# Patient Record
Sex: FEMALE | Race: WHITE
[De-identification: names, ages, dates, MRNs, and addresses within clinical notes are randomized per-mention and may not be internally consistent; named-entity substitution may affect disease eponyms.]

---

## 2017-07-12 ENCOUNTER — HOSPITAL ENCOUNTER (OUTPATIENT)
Dept: HOSPITAL 76 - DI.S | Age: 77
Discharge: HOME | End: 2017-07-12
Attending: INTERNAL MEDICINE
Payer: MEDICARE

## 2017-07-12 DIAGNOSIS — Z12.31: Primary | ICD-10-CM

## 2017-07-12 PROCEDURE — 77067 SCR MAMMO BI INCL CAD: CPT

## 2017-08-08 ENCOUNTER — HOSPITAL ENCOUNTER (OUTPATIENT)
Dept: HOSPITAL 76 - LAB | Age: 77
Discharge: HOME | End: 2017-08-08
Attending: ORTHOPAEDIC SURGERY
Payer: MEDICARE

## 2017-08-08 DIAGNOSIS — Z79.899: ICD-10-CM

## 2017-08-08 DIAGNOSIS — M47.896: Primary | ICD-10-CM

## 2017-08-08 DIAGNOSIS — M51.36: ICD-10-CM

## 2017-08-08 DIAGNOSIS — M47.897: ICD-10-CM

## 2017-08-08 DIAGNOSIS — M51.37: ICD-10-CM

## 2017-08-08 LAB
BUN SERPL-MCNC: 19 MG/DL (ref 6–20)
CREAT SERPLBLD-SCNC: 0.7 MG/DL (ref 0.4–1)
GFRSERPLBLD MDRD-ARVRAT: 81 ML/MIN/{1.73_M2} (ref 89–?)

## 2017-08-08 PROCEDURE — 84520 ASSAY OF UREA NITROGEN: CPT

## 2017-08-08 PROCEDURE — 82565 ASSAY OF CREATININE: CPT

## 2017-08-08 PROCEDURE — 72158 MRI LUMBAR SPINE W/O & W/DYE: CPT

## 2017-08-08 PROCEDURE — 36415 COLL VENOUS BLD VENIPUNCTURE: CPT

## 2017-08-09 NOTE — MRI REPORT
EXAM:

MRI LUMBAR SPINE WITHOUT AND WITH CONTRAST

 

EXAM DATE: 8/8/2017 02:35 PM.

 

CLINICAL HISTORY: Low back pain. History of surgery November 2016, effusion and laminectomy.

 

COMPARISONS: MRI of the lumbar spine 10/10/2016.

 

TECHNIQUE: Multiplanar, multisequence T1-weighted and fluid-sensitive sequences of the lumbar spine f
rom T12 to S1 before and after administration of intravenous contrast. IV contrast: 7.5 mL Gadavist. 
Other: None.

 

FINDINGS: 

Spinal Cord: The conus terminates at L1. The conus medullaris and cauda equina are unremarkable.

 

Alignment: Mild dextrorotatory scoliosis is seen centered at L3-L4. Mild, 3 mm, spondylolisthesis of 
L4 is seen in relation to L5 and L3.

 

Bone Marrow: Five non-rib-bearing lumbar vertebral bodies are assumed. No gross fractures or bone les
ions. No bone marrow edema or abnormal enhancement. Diskogenic endplate signal abnormality and enhanc
ement is seen at L3-L4.

 

Disk Levels/Facets:

T12-L1: Unremarkable on sagittal series.

 

L1-L2: Mild thickening of the ligamentum flavum is seen bilaterally. Mild loss of disk space height i
s seen. Endplate irregularity is seen greater to the right. Mild circumferential disk bulge is noted.
 No stenosis. No significant change.

 

L2-L3: Interval postoperative change from inferior bilateral laminectomy and partial facetectomy. Mod
erate loss of disk space height is seen. Mild circumferential disk bulge is seen. With posterior deco
mpression, interval resolution of previously noted canal stenosis.

 

L3-L4: Interval postoperative change from bilateral laminectomy and partial facetectomy. Moderate los
s of disk space height is seen. Diskogenic endplate irregularity with Modic type I change including e
mara and enhancement is present. Mild retrolisthesis. Mild circumferential disk bulge. Dorsal and lef
t dorsolateral disk protrusion extending inferiorly posterior to the L4 superior endplate. Associated
 posterior displacement of the descending left L4 nerve root is seen within the upper lateral recess.
 With posterior decompression, interval resolution of previously noted canal stenosis.

 

L4-L5: Interval postoperative change. Bilateral laminectomy and partial facetectomy is seen. Posterol
ateral fusion with transpedicular screws is seen. Mild spondylolisthesis. Minimal dorsal and mild fabiola
tral circumferential disk bulge. With posterior decompression, interval resolution of previously note
d canal stenosis.

 

L5-S1: Mild degenerative facet change is seen bilaterally. Moderate to marked loss of disk space heig
ht is seen. Minimal dorsal with mild lateral and ventral circumferential disk bulge. No stenosis.

 

Spinal Canal: No enhancing masses within the spinal canal. No epidural abscess.

 

Musculature: Mild atrophy of posterior paraspinous musculature is seen in the mid and lower lumbar as
 well as upper sacral spine. Posterior surgical bed edema and enhancement is seen without fluid colle
ction.

 

Other: The partially visualized retroperitoneum is unremarkable.

 

IMPRESSION: 

1. Interval postoperative change as outlined above. Posterior decompression is seen with resolution o
f previously noted multilevel canal stenosis.

 

2. L1-L2: Mild to moderate degenerative disk and facet change. No stenosis.

 

3. L2-L3: Postoperative posterior decompression. Moderate degenerative disk change. Resolution of can
al stenosis.

 

4. L3-L4: Postoperative posterior decompression. Moderate degenerative disk change. Diskogenic endpla
te signal abnormality. Resolution of previously noted canal stenosis. Persistent posterior displaceme
nt of the proximal descending left L4 nerve root, with resolution of lateral recess stenosis.

 

5. L4-L5: Postoperative change. Posterior decompression and posterolateral fusion. Resolution of bhaskar
l stenosis.

 

6. L5-S1: Spondylosis with degenerative disk and facet change. No stenosis.

 

Comment: The following findings are so common in adults without low back pain that while we report th
eir presence, they must be interpreted with caution and in the context of the clinical situation. (Re
jair Agustin et al, Spine 2001)

 

Prevalence of findings in patients without low back pain:

Disk degeneration (any evidence): 92%

Disk desiccation/T2 signal loss: 83%

Disk height loss: 56%

Disk bulge: 64%

Disk protrusion: 32%

Annular tear/high intensity zone: 38%

 

[facility]

Referring Provider Line: [phone]

 

SITE ID: [id number]

## 2018-07-10 ENCOUNTER — HOSPITAL ENCOUNTER (OUTPATIENT)
Dept: HOSPITAL 76 - LAB.F | Age: 78
Discharge: HOME | End: 2018-07-10
Attending: INTERNAL MEDICINE
Payer: MEDICARE

## 2018-07-10 DIAGNOSIS — R20.0: ICD-10-CM

## 2018-07-10 DIAGNOSIS — R03.0: ICD-10-CM

## 2018-07-10 DIAGNOSIS — E03.9: Primary | ICD-10-CM

## 2018-07-10 DIAGNOSIS — E78.5: ICD-10-CM

## 2018-07-10 DIAGNOSIS — Z86.19: ICD-10-CM

## 2018-07-10 DIAGNOSIS — R73.9: ICD-10-CM

## 2018-07-10 DIAGNOSIS — Z79.899: ICD-10-CM

## 2018-07-10 DIAGNOSIS — M25.50: ICD-10-CM

## 2018-07-10 LAB
ALBUMIN DIAFP-MCNC: 4.2 G/DL (ref 3.2–5.5)
ALBUMIN/GLOB SERPL: 1.2 {RATIO} (ref 1–2.2)
ALP SERPL-CCNC: 53 IU/L (ref 42–121)
ALT SERPL W P-5'-P-CCNC: 18 IU/L (ref 10–60)
ANION GAP SERPL CALCULATED.4IONS-SCNC: 8 MMOL/L (ref 6–13)
AST SERPL W P-5'-P-CCNC: 24 IU/L (ref 10–42)
BASOPHILS NFR BLD AUTO: 0 10^3/UL (ref 0–0.1)
BASOPHILS NFR BLD AUTO: 0.6 %
BILIRUB BLD-MCNC: 1.2 MG/DL (ref 0.2–1)
BUN SERPL-MCNC: 16 MG/DL (ref 6–20)
CALCIUM UR-MCNC: 9.8 MG/DL (ref 8.5–10.3)
CHLORIDE SERPL-SCNC: 99 MMOL/L (ref 101–111)
CHOLEST SERPL-MCNC: 288 MG/DL
CO2 SERPL-SCNC: 26 MMOL/L (ref 21–32)
CREAT SERPLBLD-SCNC: 0.6 MG/DL (ref 0.4–1)
EOSINOPHIL # BLD AUTO: 0.1 10^3/UL (ref 0–0.7)
EOSINOPHIL NFR BLD AUTO: 2.4 %
ERYTHROCYTE [DISTWIDTH] IN BLOOD BY AUTOMATED COUNT: 12 % (ref 12–15)
EST. AVERAGE GLUCOSE BLD GHB EST-MCNC: 111 MG/DL (ref 70–100)
GFRSERPLBLD MDRD-ARVRAT: 96 ML/MIN/{1.73_M2} (ref 89–?)
GLOBULIN SER-MCNC: 3.6 G/DL (ref 2.1–4.2)
GLUCOSE SERPL-MCNC: 110 MG/DL (ref 70–100)
HB2 TOTAL: 15.3 G/DL
HBA1C BLD-MCNC: 0.56 G/DL
HDLC SERPL-MCNC: 78 MG/DL
HDLC SERPL: 3.7 {RATIO} (ref ?–4.4)
HEMOGLOBIN A1C %: 5.5 % (ref 4.6–6.2)
HGB UR QL STRIP: 13.5 G/DL (ref 12–16)
LDLC SERPL CALC-MCNC: 185 MG/DL
LDLC/HDLC SERPL: 2.4 {RATIO} (ref ?–4.4)
LYMPHOCYTES # SPEC AUTO: 2.1 10^3/UL (ref 1.5–3.5)
LYMPHOCYTES NFR BLD AUTO: 42.3 %
MCH RBC QN AUTO: 32.1 PG (ref 27–31)
MCHC RBC AUTO-ENTMCNC: 33.6 G/DL (ref 32–36)
MCV RBC AUTO: 95.5 FL (ref 81–99)
MONOCYTES # BLD AUTO: 0.4 10^3/UL (ref 0–1)
MONOCYTES NFR BLD AUTO: 8.7 %
NEUTROPHILS # BLD AUTO: 2.3 10^3/UL (ref 1.5–6.6)
NEUTROPHILS # SNV AUTO: 5 X10^3/UL (ref 4.8–10.8)
NEUTROPHILS NFR BLD AUTO: 46 %
PDW BLD AUTO: 7.8 FL (ref 7.9–10.8)
PLATELET # BLD: 274 10^3/UL (ref 130–450)
PROT SPEC-MCNC: 7.8 G/DL (ref 6.7–8.2)
RBC MAR: 4.22 10^6/UL (ref 4.2–5.4)
SODIUM SERPLBLD-SCNC: 133 MMOL/L (ref 135–145)
TSH SERPL-ACNC: 2.22 UIU/ML (ref 0.34–5.6)
VIT B12 SERPL-MCNC: 410 PG/ML (ref 180–914)
VLDLC SERPL-SCNC: 25 MG/DL

## 2018-07-10 PROCEDURE — 85025 COMPLETE CBC W/AUTO DIFF WBC: CPT

## 2018-07-10 PROCEDURE — 80061 LIPID PANEL: CPT

## 2018-07-10 PROCEDURE — 80053 COMPREHEN METABOLIC PANEL: CPT

## 2018-07-10 PROCEDURE — 36415 COLL VENOUS BLD VENIPUNCTURE: CPT

## 2018-07-10 PROCEDURE — 83721 ASSAY OF BLOOD LIPOPROTEIN: CPT

## 2018-07-10 PROCEDURE — 82607 VITAMIN B-12: CPT

## 2018-07-10 PROCEDURE — 83036 HEMOGLOBIN GLYCOSYLATED A1C: CPT

## 2018-07-10 PROCEDURE — 84443 ASSAY THYROID STIM HORMONE: CPT

## 2018-07-13 ENCOUNTER — HOSPITAL ENCOUNTER (OUTPATIENT)
Dept: HOSPITAL 76 - DI | Age: 78
Discharge: HOME | End: 2018-07-13
Attending: INTERNAL MEDICINE
Payer: MEDICARE

## 2018-07-13 DIAGNOSIS — Z12.31: Primary | ICD-10-CM

## 2018-07-13 PROCEDURE — 77067 SCR MAMMO BI INCL CAD: CPT

## 2018-07-16 NOTE — MAMMOGRAPHY REPORT
Procedure Date:  07/13/2018   

Accession Number:  [id number] / [id number]                    

Procedure:  CAROLINE - Screening Mammo Dig Bilat CPT Code:  

 

FULL RESULT:

 

 

EXAM: Screening Mammo Dig Bilat

 

DATE: 7/13/2018 12:19 PM

 

CLINICAL HISTORY: 78-year-old female with a 10 year history of hormone 

therapy stopped in 1980.

 

TECHNIQUE: Bilateral CC and MLO views were obtained.

 

COMPARISON: 7/12/2017, 7/7/2016, 7/1/2015, 7/2/2014.

 

FINDINGS:

The breasts demonstrate scattered fibroglandular densities bilaterally. 

There are bilateral typically benign vascular calcifications as well as 

typically benign skin calcifications. No suspicious masses, clustered 

microcalcifications, or regions of architectural distortion are 

identified.

 

IMPRESSION: Benign findings

 

RECOMMENDATION: Routine annual screening unless otherwise clinically 

indicated.

 

BIRADS CATEGORY 2: Benign findings

 

STANDARD QUALIFYING STATEMENTS:

1.  This examination was reviewed with the aid of Computer-Aided 

Detection (CAD).

2.  A negative or benign  imaging report should not delay biopsy if 

clinically suspicious findings are present.  Consider surgical 

consultation if warrented.  More than 5% of cancers are not identified by 

imaging.

3.  Dense breasts may obscure an underlying neoplasm.

## 2019-07-16 ENCOUNTER — HOSPITAL ENCOUNTER (OUTPATIENT)
Dept: HOSPITAL 76 - DI.S | Age: 79
Discharge: HOME | End: 2019-07-16
Attending: INTERNAL MEDICINE
Payer: MEDICARE

## 2019-07-16 DIAGNOSIS — Z12.31: Primary | ICD-10-CM

## 2019-07-16 PROCEDURE — 77067 SCR MAMMO BI INCL CAD: CPT

## 2019-07-17 NOTE — MAMMOGRAPHY REPORT
Reason:  SCREENING MAMMO

Procedure Date:  07/16/2019   

Accession Number:  [id number] / [id number]                    

Procedure:  MGS - Screening Mammo Dig Bilat CPT Code:  

 

FULL RESULT:

 

 

EXAM: Screening Mammo Dig Bilat

 

DATE: 7/16/2019 10:36 AM

 

CLINICAL HISTORY:  Screening encounter. No reported risk factors.

 

TECHNIQUE: (B) - Bilateral  CC and MLO views were obtained.

 

COMPARISON: 7/13/2018 through 7/1/2015.

 

PARENCHYMAL PATTERN: (A) - The breast(s) demonstrate(s) scattered 

fibroglandular densities.

 

FINDINGS:

There are typically benign vascular calcifications. There are no 

suspicious masses, calcifications, or areas of distortion.

 

IMPRESSION: Benign findings. BI-RADS category 2.

 

RECOMMENDATION: (ANNUAL)  - Recommend routine annual screening 

mammography.

 

BI-RADS CATEGORY: (2) - Benign Findings.

 

STANDARD QUALIFYING STATEMENTS:

1.  This examination was reviewed with the aid of Computer-Aided 

Detection (CAD).

2.  A negative or benign  imaging report should not preclude biopsy if 

clinically suspicious findings are present.

3.  Dense breasts may obscure an underlying neoplasm.

4. This examination was reviewed without the aid of 3D breast imaging 

(tomosynthesis).

## 2019-07-18 ENCOUNTER — HOSPITAL ENCOUNTER (OUTPATIENT)
Dept: HOSPITAL 76 - LAB.S | Age: 79
Discharge: HOME | End: 2019-07-18
Attending: INTERNAL MEDICINE
Payer: MEDICARE

## 2019-07-18 DIAGNOSIS — C43.9: ICD-10-CM

## 2019-07-18 DIAGNOSIS — R32: ICD-10-CM

## 2019-07-18 DIAGNOSIS — R20.0: ICD-10-CM

## 2019-07-18 DIAGNOSIS — Z13.6: ICD-10-CM

## 2019-07-18 DIAGNOSIS — E03.9: ICD-10-CM

## 2019-07-18 DIAGNOSIS — R03.0: ICD-10-CM

## 2019-07-18 DIAGNOSIS — J30.2: ICD-10-CM

## 2019-07-18 DIAGNOSIS — R73.9: ICD-10-CM

## 2019-07-18 DIAGNOSIS — E78.5: Primary | ICD-10-CM

## 2019-07-18 DIAGNOSIS — Z79.899: ICD-10-CM

## 2019-07-18 LAB
ALBUMIN DIAFP-MCNC: 4.5 G/DL (ref 3.2–5.5)
ALBUMIN/GLOB SERPL: 1.3 {RATIO} (ref 1–2.2)
ALP SERPL-CCNC: 55 IU/L (ref 42–121)
ALT SERPL W P-5'-P-CCNC: 18 IU/L (ref 10–60)
ANION GAP SERPL CALCULATED.4IONS-SCNC: 12 MMOL/L (ref 6–13)
AST SERPL W P-5'-P-CCNC: 23 IU/L (ref 10–42)
BASOPHILS NFR BLD AUTO: 0 10^3/UL (ref 0–0.1)
BASOPHILS NFR BLD AUTO: 0.7 %
BILIRUB BLD-MCNC: 1.3 MG/DL (ref 0.2–1)
BUN SERPL-MCNC: 18 MG/DL (ref 6–20)
CALCIUM UR-MCNC: 9.9 MG/DL (ref 8.5–10.3)
CHLORIDE SERPL-SCNC: 98 MMOL/L (ref 101–111)
CHOLEST SERPL-MCNC: 316 MG/DL
CLARITY UR REFRACT.AUTO: (no result)
CO2 SERPL-SCNC: 26 MMOL/L (ref 21–32)
CREAT SERPLBLD-SCNC: 0.6 MG/DL (ref 0.4–1)
EOSINOPHIL # BLD AUTO: 0.2 10^3/UL (ref 0–0.7)
EOSINOPHIL NFR BLD AUTO: 3.2 %
ERYTHROCYTE [DISTWIDTH] IN BLOOD BY AUTOMATED COUNT: 11.9 % (ref 12–15)
EST. AVERAGE GLUCOSE BLD GHB EST-MCNC: 123 MG/DL (ref 70–100)
GFRSERPLBLD MDRD-ARVRAT: 96 ML/MIN/{1.73_M2} (ref 89–?)
GLOBULIN SER-MCNC: 3.6 G/DL (ref 2.1–4.2)
GLUCOSE SERPL-MCNC: 110 MG/DL (ref 70–100)
GLUCOSE UR QL STRIP.AUTO: NEGATIVE MG/DL
HB2 TOTAL: 14.3 G/DL
HBA1C BLD-MCNC: 0.58 G/DL
HDLC SERPL-MCNC: 74 MG/DL
HDLC SERPL: 4.3 {RATIO} (ref ?–4.4)
HEMOGLOBIN A1C %: 5.9 % (ref 4.6–6.2)
HGB UR QL STRIP: 13.5 G/DL (ref 12–16)
KETONES UR QL STRIP.AUTO: NEGATIVE MG/DL
LDLC SERPL CALC-MCNC: 201 MG/DL
LDLC/HDLC SERPL: 2.7 {RATIO} (ref ?–4.4)
LYMPHOCYTES # SPEC AUTO: 2 10^3/UL (ref 1.5–3.5)
LYMPHOCYTES NFR BLD AUTO: 35.1 %
MCH RBC QN AUTO: 31.5 PG (ref 27–31)
MCHC RBC AUTO-ENTMCNC: 33.3 G/DL (ref 32–36)
MCV RBC AUTO: 94.6 FL (ref 81–99)
MONOCYTES # BLD AUTO: 0.5 10^3/UL (ref 0–1)
MONOCYTES NFR BLD AUTO: 8.5 %
NEUTROPHILS # BLD AUTO: 3 10^3/UL (ref 1.5–6.6)
NEUTROPHILS # SNV AUTO: 5.6 X10^3/UL (ref 4.8–10.8)
NEUTROPHILS NFR BLD AUTO: 52.3 %
NITRITE UR QL STRIP.AUTO: POSITIVE
PDW BLD AUTO: 9.9 FL (ref 7.9–10.8)
PH UR STRIP.AUTO: 6.5 PH (ref 5–7.5)
PLATELET # BLD: 270 10^3/UL (ref 130–450)
PROT SPEC-MCNC: 8.1 G/DL (ref 6.7–8.2)
PROT UR STRIP.AUTO-MCNC: NEGATIVE MG/DL
RBC # UR STRIP.AUTO: NEGATIVE /UL
RBC # URNS HPF: (no result) /HPF (ref 0–5)
RBC MAR: 4.29 10^6/UL (ref 4.2–5.4)
SODIUM SERPLBLD-SCNC: 136 MMOL/L (ref 135–145)
SP GR UR STRIP.AUTO: 1.01 (ref 1–1.03)
SQUAMOUS URNS QL MICRO: (no result)
TSH SERPL-ACNC: 4.14 UIU/ML (ref 0.34–5.6)
UROBILINOGEN UR QL STRIP.AUTO: (no result) E.U./DL
UROBILINOGEN UR STRIP.AUTO-MCNC: NEGATIVE MG/DL
VIT B12 SERPL-MCNC: 504 PG/ML (ref 180–914)
VLDLC SERPL-SCNC: 41 MG/DL

## 2019-07-18 PROCEDURE — 36415 COLL VENOUS BLD VENIPUNCTURE: CPT

## 2019-07-18 PROCEDURE — 84443 ASSAY THYROID STIM HORMONE: CPT

## 2019-07-18 PROCEDURE — 83036 HEMOGLOBIN GLYCOSYLATED A1C: CPT

## 2019-07-18 PROCEDURE — 80061 LIPID PANEL: CPT

## 2019-07-18 PROCEDURE — 87181 SC STD AGAR DILUTION PER AGT: CPT

## 2019-07-18 PROCEDURE — 87086 URINE CULTURE/COLONY COUNT: CPT

## 2019-07-18 PROCEDURE — 83721 ASSAY OF BLOOD LIPOPROTEIN: CPT

## 2019-07-18 PROCEDURE — 81001 URINALYSIS AUTO W/SCOPE: CPT

## 2019-07-18 PROCEDURE — 80053 COMPREHEN METABOLIC PANEL: CPT

## 2019-07-18 PROCEDURE — 81003 URINALYSIS AUTO W/O SCOPE: CPT

## 2019-07-18 PROCEDURE — 85025 COMPLETE CBC W/AUTO DIFF WBC: CPT

## 2019-07-18 PROCEDURE — 82607 VITAMIN B-12: CPT

## 2019-12-07 ENCOUNTER — HOSPITAL ENCOUNTER (EMERGENCY)
Dept: HOSPITAL 76 - ED | Age: 79
LOS: 1 days | Discharge: HOME | End: 2019-12-08
Payer: MEDICARE

## 2019-12-07 ENCOUNTER — HOSPITAL ENCOUNTER (OUTPATIENT)
Dept: HOSPITAL 76 - EMS | Age: 79
Discharge: ANOTHER HOSPITAL | End: 2019-12-07
Attending: SURGERY
Payer: MEDICARE

## 2019-12-07 DIAGNOSIS — R55: Primary | ICD-10-CM

## 2019-12-07 LAB
ALBUMIN DIAFP-MCNC: 4.3 G/DL (ref 3.2–5.5)
ALBUMIN/GLOB SERPL: 1.1 {RATIO} (ref 1–2.2)
ALP SERPL-CCNC: 59 IU/L (ref 42–121)
ALT SERPL W P-5'-P-CCNC: 23 IU/L (ref 10–60)
ANION GAP SERPL CALCULATED.4IONS-SCNC: 14 MMOL/L (ref 6–13)
AST SERPL W P-5'-P-CCNC: 26 IU/L (ref 10–42)
BASOPHILS NFR BLD AUTO: 0 10^3/UL (ref 0–0.1)
BASOPHILS NFR BLD AUTO: 0.5 %
BILIRUB BLD-MCNC: 0.8 MG/DL (ref 0.2–1)
BUN SERPL-MCNC: 25 MG/DL (ref 6–20)
CALCIUM UR-MCNC: 9.9 MG/DL (ref 8.5–10.3)
CHLORIDE SERPL-SCNC: 98 MMOL/L (ref 101–111)
CO2 SERPL-SCNC: 23 MMOL/L (ref 21–32)
CREAT SERPLBLD-SCNC: 0.8 MG/DL (ref 0.4–1)
EOSINOPHIL # BLD AUTO: 0.1 10^3/UL (ref 0–0.7)
EOSINOPHIL NFR BLD AUTO: 1.6 %
ERYTHROCYTE [DISTWIDTH] IN BLOOD BY AUTOMATED COUNT: 11.7 % (ref 12–15)
GFRSERPLBLD MDRD-ARVRAT: 69 ML/MIN/{1.73_M2} (ref 89–?)
GLOBULIN SER-MCNC: 3.8 G/DL (ref 2.1–4.2)
GLUCOSE SERPL-MCNC: 114 MG/DL (ref 70–100)
HGB UR QL STRIP: 13 G/DL (ref 12–16)
LIPASE SERPL-CCNC: 42 U/L (ref 22–51)
LYMPHOCYTES # SPEC AUTO: 1.5 10^3/UL (ref 1.5–3.5)
LYMPHOCYTES NFR BLD AUTO: 20.4 %
MCH RBC QN AUTO: 31.9 PG (ref 27–31)
MCHC RBC AUTO-ENTMCNC: 34.1 G/DL (ref 32–36)
MCV RBC AUTO: 93.4 FL (ref 81–99)
MONOCYTES # BLD AUTO: 0.6 10^3/UL (ref 0–1)
MONOCYTES NFR BLD AUTO: 7.9 %
NEUTROPHILS # BLD AUTO: 5.1 10^3/UL (ref 1.5–6.6)
NEUTROPHILS # SNV AUTO: 7.4 X10^3/UL (ref 4.8–10.8)
NEUTROPHILS NFR BLD AUTO: 69.2 %
PDW BLD AUTO: 8.9 FL (ref 7.9–10.8)
PLATELET # BLD: 260 10^3/UL (ref 130–450)
PROT SPEC-MCNC: 8.1 G/DL (ref 6.7–8.2)
RBC MAR: 4.08 10^6/UL (ref 4.2–5.4)
SODIUM SERPLBLD-SCNC: 135 MMOL/L (ref 135–145)

## 2019-12-07 PROCEDURE — 93005 ELECTROCARDIOGRAM TRACING: CPT

## 2019-12-07 PROCEDURE — 83690 ASSAY OF LIPASE: CPT

## 2019-12-07 PROCEDURE — 71046 X-RAY EXAM CHEST 2 VIEWS: CPT

## 2019-12-07 PROCEDURE — 80053 COMPREHEN METABOLIC PANEL: CPT

## 2019-12-07 PROCEDURE — 85025 COMPLETE CBC W/AUTO DIFF WBC: CPT

## 2019-12-07 PROCEDURE — 36415 COLL VENOUS BLD VENIPUNCTURE: CPT

## 2019-12-07 PROCEDURE — 99284 EMERGENCY DEPT VISIT MOD MDM: CPT

## 2019-12-07 PROCEDURE — 84484 ASSAY OF TROPONIN QUANT: CPT

## 2019-12-08 VITALS — DIASTOLIC BLOOD PRESSURE: 88 MMHG | SYSTOLIC BLOOD PRESSURE: 172 MMHG

## 2019-12-08 NOTE — XRAY REPORT
Reason:  syncope

Procedure Date:  12/08/2019   

Accession Number:  [id number] / [id number]                    

Procedure:  XR  - Chest 2 View X-Ray CPT Code:  78016

 

***Final Report***

 

 

FULL RESULT:

 

 

EXAM:

CHEST RADIOGRAPHY

 

EXAM DATE: 12/8/2019 12:45 AM.

 

CLINICAL HISTORY: Syncope.

 

COMPARISON: None.

 

TECHNIQUE: 2 views.

 

FINDINGS:

Lungs/Pleura: No alveolar consolidation or pleural effusion seen. No 

pneumothorax.

 

Mediastinum: Heart size upper normal.

 

Other: Osteopenia. Right shoulder prosthesis.

IMPRESSION:

1. Heart size upper normal. No acute abnormality seen.

 

[facility]

## 2021-12-21 ENCOUNTER — HOSPITAL ENCOUNTER (OUTPATIENT)
Dept: HOSPITAL 76 - LAB.S | Age: 81
Discharge: HOME | End: 2021-12-21
Attending: NURSE PRACTITIONER
Payer: MEDICARE

## 2021-12-21 DIAGNOSIS — I10: ICD-10-CM

## 2021-12-21 DIAGNOSIS — E88.81: ICD-10-CM

## 2021-12-21 DIAGNOSIS — E03.9: ICD-10-CM

## 2021-12-21 DIAGNOSIS — E78.5: Primary | ICD-10-CM

## 2021-12-21 LAB
ALBUMIN DIAFP-MCNC: 4.5 G/DL (ref 3.2–5.5)
ALBUMIN/GLOB SERPL: 1.3 {RATIO} (ref 1–2.2)
ALP SERPL-CCNC: 55 IU/L (ref 42–121)
ALT SERPL W P-5'-P-CCNC: 19 IU/L (ref 10–60)
ANION GAP SERPL CALCULATED.4IONS-SCNC: 10 MMOL/L (ref 6–13)
AST SERPL W P-5'-P-CCNC: 25 IU/L (ref 10–42)
BASOPHILS NFR BLD AUTO: 0 10^3/UL (ref 0–0.1)
BASOPHILS NFR BLD AUTO: 0.7 %
BILIRUB BLD-MCNC: 1 MG/DL (ref 0.2–1)
BUN SERPL-MCNC: 22 MG/DL (ref 6–20)
CALCIUM UR-MCNC: 9.7 MG/DL (ref 8.5–10.3)
CHLORIDE SERPL-SCNC: 96 MMOL/L (ref 101–111)
CHOLEST SERPL-MCNC: 302 MG/DL
CO2 SERPL-SCNC: 24 MMOL/L (ref 21–32)
CREAT SERPLBLD-SCNC: 0.6 MG/DL (ref 0.4–1)
EOSINOPHIL # BLD AUTO: 0.1 10^3/UL (ref 0–0.7)
EOSINOPHIL NFR BLD AUTO: 2.4 %
ERYTHROCYTE [DISTWIDTH] IN BLOOD BY AUTOMATED COUNT: 11.8 % (ref 12–15)
GFRSERPLBLD MDRD-ARVRAT: 95 ML/MIN/{1.73_M2} (ref 89–?)
GLOBULIN SER-MCNC: 3.5 G/DL (ref 2.1–4.2)
GLUCOSE SERPL-MCNC: 113 MG/DL (ref 70–100)
HCT VFR BLD AUTO: 39.2 % (ref 37–47)
HDLC SERPL-MCNC: 81 MG/DL
HDLC SERPL: 3.7 {RATIO} (ref ?–4.4)
HGB UR QL STRIP: 13.2 G/DL (ref 12–16)
LDLC SERPL CALC-MCNC: 199 MG/DL
LDLC/HDLC SERPL: 2.5 {RATIO} (ref ?–4.4)
LYMPHOCYTES # SPEC AUTO: 2.1 10^3/UL (ref 1.5–3.5)
LYMPHOCYTES NFR BLD AUTO: 37.5 %
MCH RBC QN AUTO: 32.2 PG (ref 27–31)
MCHC RBC AUTO-ENTMCNC: 33.7 G/DL (ref 32–36)
MCV RBC AUTO: 95.6 FL (ref 81–99)
MONOCYTES # BLD AUTO: 0.5 10^3/UL (ref 0–1)
MONOCYTES NFR BLD AUTO: 8.8 %
NEUTROPHILS # BLD AUTO: 2.8 10^3/UL (ref 1.5–6.6)
NEUTROPHILS # SNV AUTO: 5.5 X10^3/UL (ref 4.8–10.8)
NEUTROPHILS NFR BLD AUTO: 50.4 %
NRBC # BLD AUTO: 0 /100WBC
NRBC # BLD AUTO: 0 X10^3/UL
PDW BLD AUTO: 9.5 FL (ref 7.9–10.8)
PLATELET # BLD: 286 10^3/UL (ref 130–450)
POTASSIUM SERPL-SCNC: 4.2 MMOL/L (ref 3.5–5)
PROT SPEC-MCNC: 8 G/DL (ref 6.7–8.2)
RBC MAR: 4.1 10^6/UL (ref 4.2–5.4)
SODIUM SERPLBLD-SCNC: 130 MMOL/L (ref 135–145)
TRIGL P FAST SERPL-MCNC: 110 MG/DL
TSH SERPL-ACNC: 3.73 UIU/ML (ref 0.34–5.6)
VLDLC SERPL-SCNC: 22 MG/DL

## 2021-12-21 PROCEDURE — 84443 ASSAY THYROID STIM HORMONE: CPT

## 2021-12-21 PROCEDURE — 80053 COMPREHEN METABOLIC PANEL: CPT

## 2021-12-21 PROCEDURE — 83721 ASSAY OF BLOOD LIPOPROTEIN: CPT

## 2021-12-21 PROCEDURE — 36415 COLL VENOUS BLD VENIPUNCTURE: CPT

## 2021-12-21 PROCEDURE — 85025 COMPLETE CBC W/AUTO DIFF WBC: CPT

## 2021-12-21 PROCEDURE — 80061 LIPID PANEL: CPT

## 2022-08-02 ENCOUNTER — HOSPITAL ENCOUNTER (OUTPATIENT)
Dept: HOSPITAL 76 - LAB.S | Age: 82
Discharge: HOME | End: 2022-08-02
Attending: PHYSICIAN ASSISTANT
Payer: MEDICARE

## 2022-08-02 DIAGNOSIS — N39.0: ICD-10-CM

## 2022-08-02 DIAGNOSIS — E03.9: ICD-10-CM

## 2022-08-02 DIAGNOSIS — I10: Primary | ICD-10-CM

## 2022-08-02 LAB
ALBUMIN DIAFP-MCNC: 4.5 G/DL (ref 3.2–5.5)
ALBUMIN/GLOB SERPL: 1.2 {RATIO} (ref 1–2.2)
ALP SERPL-CCNC: 65 IU/L (ref 42–121)
ALT SERPL W P-5'-P-CCNC: 19 IU/L (ref 10–60)
ANION GAP SERPL CALCULATED.4IONS-SCNC: 12 MMOL/L (ref 6–13)
AST SERPL W P-5'-P-CCNC: 23 IU/L (ref 10–42)
BASOPHILS NFR BLD AUTO: 0 10^3/UL (ref 0–0.1)
BASOPHILS NFR BLD AUTO: 0.5 %
BILIRUB BLD-MCNC: 1.1 MG/DL (ref 0.2–1)
BUN SERPL-MCNC: 20 MG/DL (ref 6–20)
CALCIUM UR-MCNC: 10.3 MG/DL (ref 8.5–10.3)
CHLORIDE SERPL-SCNC: 93 MMOL/L (ref 101–111)
CHOLEST SERPL-MCNC: 273 MG/DL
CLARITY UR REFRACT.AUTO: (no result)
CO2 SERPL-SCNC: 25 MMOL/L (ref 21–32)
CREAT SERPLBLD-SCNC: 0.6 MG/DL (ref 0.4–1)
EOSINOPHIL # BLD AUTO: 0.1 10^3/UL (ref 0–0.7)
EOSINOPHIL NFR BLD AUTO: 1.1 %
ERYTHROCYTE [DISTWIDTH] IN BLOOD BY AUTOMATED COUNT: 11.6 % (ref 12–15)
GFRSERPLBLD MDRD-ARVRAT: 95 ML/MIN/{1.73_M2} (ref 89–?)
GLOBULIN SER-MCNC: 3.8 G/DL (ref 2.1–4.2)
GLUCOSE SERPL-MCNC: 116 MG/DL (ref 70–100)
GLUCOSE UR QL STRIP.AUTO: NEGATIVE MG/DL
HCT VFR BLD AUTO: 38 % (ref 37–47)
HDLC SERPL-MCNC: 88 MG/DL
HDLC SERPL: 3.1 {RATIO} (ref ?–4.4)
HGB UR QL STRIP: 13.1 G/DL (ref 12–16)
KETONES UR QL STRIP.AUTO: 15 MG/DL
LDLC SERPL CALC-MCNC: 161 MG/DL
LDLC/HDLC SERPL: 1.8 {RATIO} (ref ?–4.4)
LYMPHOCYTES # SPEC AUTO: 1.2 10^3/UL (ref 1.5–3.5)
LYMPHOCYTES NFR BLD AUTO: 17.8 %
MCH RBC QN AUTO: 32 PG (ref 27–31)
MCHC RBC AUTO-ENTMCNC: 34.5 G/DL (ref 32–36)
MCV RBC AUTO: 92.9 FL (ref 81–99)
MONOCYTES # BLD AUTO: 0.6 10^3/UL (ref 0–1)
MONOCYTES NFR BLD AUTO: 8.6 %
NEUTROPHILS # BLD AUTO: 4.8 10^3/UL (ref 1.5–6.6)
NEUTROPHILS # SNV AUTO: 6.6 X10^3/UL (ref 4.8–10.8)
NEUTROPHILS NFR BLD AUTO: 71.4 %
NITRITE UR QL STRIP.AUTO: POSITIVE
NRBC # BLD AUTO: 0 /100WBC
NRBC # BLD AUTO: 0 X10^3/UL
PDW BLD AUTO: 9.5 FL (ref 7.9–10.8)
PH UR STRIP.AUTO: 6 PH (ref 5–7.5)
PLATELET # BLD: 294 10^3/UL (ref 130–450)
POTASSIUM SERPL-SCNC: 4.4 MMOL/L (ref 3.5–5)
PROT SPEC-MCNC: 8.3 G/DL (ref 6.7–8.2)
PROT UR STRIP.AUTO-MCNC: NEGATIVE MG/DL
RBC # UR STRIP.AUTO: NEGATIVE /UL
RBC # URNS HPF: (no result) /HPF (ref 0–5)
RBC MAR: 4.09 10^6/UL (ref 4.2–5.4)
SODIUM SERPLBLD-SCNC: 130 MMOL/L (ref 135–145)
SP GR UR STRIP.AUTO: 1.02 (ref 1–1.03)
SQUAMOUS URNS QL MICRO: (no result)
TRIGL P FAST SERPL-MCNC: 118 MG/DL
TSH SERPL-ACNC: 1.27 UIU/ML (ref 0.34–5.6)
UROBILINOGEN UR QL STRIP.AUTO: (no result) E.U./DL
UROBILINOGEN UR STRIP.AUTO-MCNC: NEGATIVE MG/DL
VLDLC SERPL-SCNC: 24 MG/DL
WBC # UR MANUAL: >25 /HPF (ref 0–5)

## 2022-08-02 PROCEDURE — 85025 COMPLETE CBC W/AUTO DIFF WBC: CPT

## 2022-08-02 PROCEDURE — 80053 COMPREHEN METABOLIC PANEL: CPT

## 2022-08-02 PROCEDURE — 84443 ASSAY THYROID STIM HORMONE: CPT

## 2022-08-02 PROCEDURE — 87086 URINE CULTURE/COLONY COUNT: CPT

## 2022-08-02 PROCEDURE — 83721 ASSAY OF BLOOD LIPOPROTEIN: CPT

## 2022-08-02 PROCEDURE — 36415 COLL VENOUS BLD VENIPUNCTURE: CPT

## 2022-08-02 PROCEDURE — 80061 LIPID PANEL: CPT

## 2022-08-02 PROCEDURE — 81001 URINALYSIS AUTO W/SCOPE: CPT

## 2022-09-27 ENCOUNTER — HOSPITAL ENCOUNTER (OUTPATIENT)
Dept: HOSPITAL 76 - LAB.S | Age: 82
Discharge: HOME | End: 2022-09-27
Attending: NURSE PRACTITIONER
Payer: MEDICARE

## 2022-09-27 DIAGNOSIS — E87.1: Primary | ICD-10-CM

## 2022-09-27 DIAGNOSIS — I10: ICD-10-CM

## 2022-09-27 LAB
ANION GAP SERPL CALCULATED.4IONS-SCNC: 11 MMOL/L (ref 6–13)
BUN SERPL-MCNC: 18 MG/DL (ref 6–20)
CALCIUM UR-MCNC: 10 MG/DL (ref 8.5–10.3)
CHLORIDE SERPL-SCNC: 98 MMOL/L (ref 101–111)
CO2 SERPL-SCNC: 25 MMOL/L (ref 21–32)
CREAT SERPLBLD-SCNC: 0.6 MG/DL (ref 0.4–1)
GFRSERPLBLD MDRD-ARVRAT: 95 ML/MIN/{1.73_M2} (ref 89–?)
GLUCOSE SERPL-MCNC: 114 MG/DL (ref 70–100)
POTASSIUM SERPL-SCNC: 4.2 MMOL/L (ref 3.5–5)
SODIUM SERPLBLD-SCNC: 134 MMOL/L (ref 135–145)

## 2022-09-27 PROCEDURE — 36415 COLL VENOUS BLD VENIPUNCTURE: CPT

## 2022-09-27 PROCEDURE — 80048 BASIC METABOLIC PNL TOTAL CA: CPT

## 2023-01-09 ENCOUNTER — HOSPITAL ENCOUNTER (OUTPATIENT)
Dept: HOSPITAL 76 - LAB.S | Age: 83
Discharge: HOME | End: 2023-01-09
Attending: PSYCHIATRY & NEUROLOGY
Payer: MEDICARE

## 2023-01-09 DIAGNOSIS — R73.09: Primary | ICD-10-CM

## 2023-01-09 LAB
ALBUMIN DIAFP-MCNC: 4.1 G/DL (ref 3.2–5.5)
ALBUMIN/GLOB SERPL: 1.1 {RATIO} (ref 1–2.2)
ALP SERPL-CCNC: 60 IU/L (ref 42–121)
ALT SERPL W P-5'-P-CCNC: 20 IU/L (ref 10–60)
ANION GAP SERPL CALCULATED.4IONS-SCNC: 8 MMOL/L (ref 6–13)
AST SERPL W P-5'-P-CCNC: 24 IU/L (ref 10–42)
BILIRUB BLD-MCNC: 1 MG/DL (ref 0.2–1)
BUN SERPL-MCNC: 24 MG/DL (ref 6–20)
CALCIUM UR-MCNC: 10 MG/DL (ref 8.5–10.3)
CHLORIDE SERPL-SCNC: 100 MMOL/L (ref 101–111)
CO2 SERPL-SCNC: 27 MMOL/L (ref 21–32)
CREAT SERPLBLD-SCNC: 0.7 MG/DL (ref 0.4–1)
EST. AVERAGE GLUCOSE BLD GHB EST-MCNC: 120 MG/DL (ref 70–100)
GFRSERPLBLD MDRD-ARVRAT: 80 ML/MIN/{1.73_M2} (ref 89–?)
GLOBULIN SER-MCNC: 3.7 G/DL (ref 2.1–4.2)
GLUCOSE SERPL-MCNC: 126 MG/DL (ref 70–100)
HBA1C MFR BLD HPLC: 5.8 % (ref 4.27–6.07)
POTASSIUM SERPL-SCNC: 4.9 MMOL/L (ref 3.5–5)
PROT SPEC-MCNC: 7.8 G/DL (ref 6.7–8.2)
SODIUM SERPLBLD-SCNC: 135 MMOL/L (ref 135–145)

## 2023-01-09 PROCEDURE — 82985 ASSAY OF GLYCATED PROTEIN: CPT

## 2023-01-09 PROCEDURE — 36415 COLL VENOUS BLD VENIPUNCTURE: CPT

## 2023-01-09 PROCEDURE — 80053 COMPREHEN METABOLIC PANEL: CPT

## 2023-01-09 PROCEDURE — 83036 HEMOGLOBIN GLYCOSYLATED A1C: CPT

## 2023-01-23 ENCOUNTER — HOSPITAL ENCOUNTER (OUTPATIENT)
Dept: HOSPITAL 76 - LAB.S | Age: 83
Discharge: HOME | End: 2023-01-23
Attending: INTERNAL MEDICINE
Payer: MEDICARE

## 2023-01-23 DIAGNOSIS — R73.09: Primary | ICD-10-CM

## 2023-01-23 PROCEDURE — 82985 ASSAY OF GLYCATED PROTEIN: CPT

## 2023-01-23 PROCEDURE — 36415 COLL VENOUS BLD VENIPUNCTURE: CPT

## 2023-01-31 ENCOUNTER — HOSPITAL ENCOUNTER (OUTPATIENT)
Dept: HOSPITAL 76 - LAB | Age: 83
Discharge: HOME | End: 2023-01-31
Attending: INTERNAL MEDICINE
Payer: MEDICARE

## 2023-01-31 DIAGNOSIS — R73.09: Primary | ICD-10-CM

## 2023-01-31 DIAGNOSIS — Z11.1: ICD-10-CM

## 2023-01-31 PROCEDURE — 81599 UNLISTED MAAA: CPT

## 2023-01-31 PROCEDURE — 82985 ASSAY OF GLYCATED PROTEIN: CPT

## 2023-01-31 PROCEDURE — 86480 TB TEST CELL IMMUN MEASURE: CPT

## 2023-02-04 ENCOUNTER — HOSPITAL ENCOUNTER (OUTPATIENT)
Dept: HOSPITAL 76 - LAB | Age: 83
Discharge: HOME | End: 2023-02-04
Attending: NURSE PRACTITIONER
Payer: MEDICARE

## 2023-02-04 DIAGNOSIS — Z20.822: ICD-10-CM

## 2023-02-04 DIAGNOSIS — Z00.00: Primary | ICD-10-CM

## 2023-02-04 LAB
FLUAV RNA RESP QL NAA+PROBE: NOT DETECTED
HAEM INFLU B DNA SPEC QL NAA+PROBE: NOT DETECTED
RSV RNA RESP QL NAA+PROBE: NOT DETECTED
SARS-COV-2 RNA PNL SPEC NAA+PROBE: NOT DETECTED

## 2023-02-04 PROCEDURE — 87637 SARSCOV2&INF A&B&RSV AMP PRB: CPT

## 2023-09-06 ENCOUNTER — HOSPITAL ENCOUNTER (OUTPATIENT)
Dept: HOSPITAL 76 - LAB.S | Age: 83
Discharge: HOME | End: 2023-09-06
Attending: INTERNAL MEDICINE
Payer: MEDICARE

## 2023-09-06 DIAGNOSIS — E03.9: ICD-10-CM

## 2023-09-06 DIAGNOSIS — I10: Primary | ICD-10-CM

## 2023-09-06 DIAGNOSIS — E78.5: ICD-10-CM

## 2023-09-06 LAB
ANION GAP SERPL CALCULATED.4IONS-SCNC: 6 MMOL/L (ref 6–13)
BASOPHILS NFR BLD AUTO: 0 10^3/UL (ref 0–0.1)
BASOPHILS NFR BLD AUTO: 0.7 %
BUN SERPL-MCNC: 18 MG/DL (ref 6–20)
CALCIUM UR-MCNC: 10.1 MG/DL (ref 8.5–10.3)
CHLORIDE SERPL-SCNC: 100 MMOL/L (ref 101–111)
CHOLEST SERPL-MCNC: 306 MG/DL
CO2 SERPL-SCNC: 28 MMOL/L (ref 21–32)
CREAT SERPLBLD-SCNC: 0.5 MG/DL (ref 0.6–1.3)
EOSINOPHIL # BLD AUTO: 0.2 10^3/UL (ref 0–0.7)
EOSINOPHIL NFR BLD AUTO: 3 %
ERYTHROCYTE [DISTWIDTH] IN BLOOD BY AUTOMATED COUNT: 12.2 % (ref 12–15)
GFRSERPLBLD MDRD-ARVRAT: 117 ML/MIN/{1.73_M2} (ref 89–?)
GLUCOSE SERPL-MCNC: 109 MG/DL (ref 74–104)
HCT VFR BLD AUTO: 39.3 % (ref 37–47)
HDLC SERPL-MCNC: 101 MG/DL
HDLC SERPL: 3 {RATIO} (ref ?–4.4)
HGB UR QL STRIP: 12.9 G/DL (ref 12–16)
LDLC SERPL CALC-MCNC: 182 MG/DL
LDLC/HDLC SERPL: 1.8 {RATIO} (ref ?–4.4)
LYMPHOCYTES # SPEC AUTO: 2.1 10^3/UL (ref 1.5–3.5)
LYMPHOCYTES NFR BLD AUTO: 34.7 %
MCH RBC QN AUTO: 31.6 PG (ref 27–31)
MCHC RBC AUTO-ENTMCNC: 32.8 G/DL (ref 32–36)
MCV RBC AUTO: 96.3 FL (ref 81–99)
MONOCYTES # BLD AUTO: 0.6 10^3/UL (ref 0–1)
MONOCYTES NFR BLD AUTO: 10.6 %
NEUTROPHILS # BLD AUTO: 3 10^3/UL (ref 1.5–6.6)
NEUTROPHILS # SNV AUTO: 5.9 X10^3/UL (ref 4.8–10.8)
NEUTROPHILS NFR BLD AUTO: 50.7 %
NRBC # BLD AUTO: 0 /100WBC
NRBC # BLD AUTO: 0 X10^3/UL
PDW BLD AUTO: 9.4 FL (ref 7.9–10.8)
PLATELET # BLD: 263 10^3/UL (ref 130–450)
POTASSIUM SERPL-SCNC: 4.3 MMOL/L (ref 3.5–4.5)
RBC MAR: 4.08 10^6/UL (ref 4.2–5.4)
SODIUM SERPLBLD-SCNC: 134 MMOL/L (ref 135–145)
TRIGL P FAST SERPL-MCNC: 117 MG/DL (ref 48–352)
TSH SERPL-ACNC: 2.55 UIU/ML (ref 0.34–5.6)
VLDLC SERPL-SCNC: 23 MG/DL

## 2023-09-06 PROCEDURE — 84443 ASSAY THYROID STIM HORMONE: CPT

## 2023-09-06 PROCEDURE — 85025 COMPLETE CBC W/AUTO DIFF WBC: CPT

## 2023-09-06 PROCEDURE — 36415 COLL VENOUS BLD VENIPUNCTURE: CPT

## 2023-09-06 PROCEDURE — 80061 LIPID PANEL: CPT

## 2023-09-06 PROCEDURE — 83721 ASSAY OF BLOOD LIPOPROTEIN: CPT

## 2023-09-06 PROCEDURE — 80048 BASIC METABOLIC PNL TOTAL CA: CPT

## 2023-09-06 PROCEDURE — 80053 COMPREHEN METABOLIC PANEL: CPT

## 2024-01-30 NOTE — ED PHYSICIAN DOCUMENTATION
PD HPI SYNCOPE





- Stated complaint


Stated Complaint: SYNCOPE





- Chief complaint


Chief Complaint: Neuro





- History obtained from


History obtained from: Patient





- History of Present Illness


Witnessed: Witnessed


Timing - onset: How many hours ago (approximately 1 hour ago)


Duration: Minutes (2)


Preceding symptoms: Light headed, Generalized weakness


Associated symptoms: None


Contributing factors: None


Injury occurred: None.  No: Fell (helped to ground)


Pain level max: 0


Pain level now: 0


Similar symptoms before: Has not had sx before


Recently seen: Not recently seen





- Additional information


Additional information: 





Patient was at a social gathering tonight, standing and talking to others. She 

experienced rapid onset of lightheadedness, generalized weakness. She walked 

over to a chair to sit down, but the symptoms progressed. Others at the 

gathering helped her to the ground, and she had a loss of consciousness that 

lasted approximately two minutes. She subsequently rapidly regained 

consciousness and return to her baseline. She presented emergency department 

asymptomatic. Denies history of similar symptoms.





Review of Systems


Constitutional: reports: Reviewed and negative


Eyes: reports: Reviewed and negative


Cardiac: reports: Reviewed and negative


Respiratory: reports: Reviewed and negative


GI: reports: Reviewed and negative


: denies: Incontinent


Musculoskeletal: reports: Reviewed and negative


Neurologic: reports: Generalized weakness (preceding syncope but resolved), 

Syncope.  denies: Focal weakness, Numbness, Headache, Head injury





PD PAST MEDICAL HISTORY





- Past Medical History


Past Medical History: Yes


Cardiovascular: High cholesterol


Endocrine/Autoimmune: HyPOthyroidism





- Past Surgical History


Past Surgical History: No





- Present Medications


Home Medications: 


                                Ambulatory Orders











 Medication  Instructions  Recorded  Confirmed


 


Levothyroxine Sodium [Synthroid] 1 tab PO DAILY 12/07/19 12/07/19


 


Pravastatin Sodium 1 tab PO QPM 12/07/19 12/07/19














- Allergies


Allergies/Adverse Reactions: 


                                    Allergies











Allergy/AdvReac Type Severity Reaction Status Date / Time


 


No Known Drug Allergies Allergy   Verified 12/07/19 23:05














PD ED PE NORMAL





- Vitals


Vital signs reviewed: Yes





- General


General: Alert and oriented X 3, No acute distress, Well developed/nourished





- HEENT


HEENT: Atraumatic, PERRL, EOMI, Moist mucous membranes, Pharynx benign





- Neck


Neck: Supple, no meningeal sign, No bony TTP





- Cardiac


Cardiac: RRR, No murmur





- Respiratory


Respiratory: No respiratory distress, Clear bilaterally





- Abdomen


Abdomen: Soft, Non tender





- Derm


Derm: Normal color





- Neuro


Neuro: Alert and oriented X 3, CNs 2-12 intact, No motor deficit, No sensory 

deficit, Normal speech


Eye Opening: Spontaneous


Motor: Obeys Commands


Verbal: Oriented


GCS Score: 15





Results





- Vitals


Vitals: 


                                     Oxygen











O2 Source                      Room air

















- EKG (time done)


  ** No standard instances


Rate: Rate (enter#) (95)


Rhythm: NSR


Axis: LAD


Intervals: Prolonged OR (borderline)


QRS: LVH


Ischemia: Normal ST segments





- Labs


Labs: 


                                Laboratory Tests











  12/07/19 12/07/19 12/07/19





  23:20 23:20 23:20


 


WBC  7.4  


 


RBC  4.08 L  


 


Hgb  13.0  


 


Hct  38.1  


 


MCV  93.4  


 


MCH  31.9 H  


 


MCHC  34.1  


 


RDW  11.7 L  


 


Plt Count  260  


 


MPV  8.9  


 


Neut # (Auto)  5.1  


 


Lymph # (Auto)  1.5  


 


Mono # (Auto)  0.6  


 


Eos # (Auto)  0.1  


 


Baso # (Auto)  0.0  


 


Absolute Nucleated RBC  0.00  


 


Nucleated RBC %  0.0  


 


Sodium   135 


 


Potassium   3.9 


 


Chloride   98 L 


 


Carbon Dioxide   23 


 


Anion Gap   14.0 H 


 


BUN   25 H 


 


Creatinine   0.8 


 


Estimated GFR (MDRD)   69 L 


 


Glucose   114 H 


 


Calcium   9.9 


 


Total Bilirubin   0.8 


 


AST   26 


 


ALT   23 


 


Alkaline Phosphatase   59 


 


Troponin I High Sens    10.4


 


Total Protein   8.1 


 


Albumin   4.3 


 


Globulin   3.8 


 


Albumin/Globulin Ratio   1.1 


 


Lipase   42 














- Rads (name of study)


  ** chest xray


Radiology: Prelim report reviewed, See rad report





PD MEDICAL DECISION MAKING





- ED course


Complexity details: reviewed results, re-evaluated patient, considered 

differential, d/w patient





Departure





- Departure


Disposition: 01 Home, Self Care


Clinical Impression: 


 Syncope





Condition: Good


Instructions:  ED Fainting Unkn Cause


Follow-Up: 


Alexandrea Hernandez MD [Primary Care Provider] - 


Discharge Date/Time: 12/08/19 01:24 - - -